# Patient Record
Sex: MALE
[De-identification: names, ages, dates, MRNs, and addresses within clinical notes are randomized per-mention and may not be internally consistent; named-entity substitution may affect disease eponyms.]

---

## 2019-08-07 ENCOUNTER — HOSPITAL ENCOUNTER (EMERGENCY)
Dept: HOSPITAL 39 - ER | Age: 49
Discharge: HOME | End: 2019-08-07
Payer: COMMERCIAL

## 2019-08-07 VITALS — DIASTOLIC BLOOD PRESSURE: 84 MMHG | SYSTOLIC BLOOD PRESSURE: 125 MMHG | OXYGEN SATURATION: 100 %

## 2019-08-07 VITALS — TEMPERATURE: 97.1 F
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
## 2019-08-07 DIAGNOSIS — S90.31XA: ICD-10-CM
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
## 2019-08-07 DIAGNOSIS — S01.01XA: Primary | ICD-10-CM

## 2019-08-07 NOTE — ED.PDOC
History of Present Illness





- General


Chief Complaint: Laceration


Stated Complaint: scalp laceration/right foot pain


Time Seen by Provider: 08/07/19 14:03


Source: patient


Exam Limitations: no limitations





- History of Present Illness


Initial Comments: 





Mark Zhao 49 y/o male came to ER stating that at work metal pipe 

accidentally fell on top of his head and right foot with bleeding on his head 

and dull ache right foot.No LOC,no blurry vision,no neck pains,N/V.


Timing/Duration: 1-3 hours


Severity: moderate


Improving Factors: nothing


Worsening Factors: nothing


Associated Symptoms: other - see hpi


Home Medications: 


Ambulatory Orders





Acetaminophen W/ Codeine [Tylenol/Codeine #4 300-60 mg] 1 ea PO ONCE PRN #10 tab

08/07/19 











Review of Systems





- Review of Systems


Musculoskeletal: States: see HPI, other - foot pain


Skin: States: see HPI, other - scalp laceration


All other Systems: Reviewed and Negative, No Change from Baseline





Past Medical History (General)





- Patient Medical History


Surgical History: no surgical history





- Vaccination History


Hx Tetanus, Diphtheria Vaccination: Yes - 2 years ago





Family Medical History





- Family History


  ** Mother


Family History: No Known





Physical Exam





- Physical Exam


General Appearance: Alert, Comfortable, No apparent distress


Eye Exam: bilateral normal


Ears, Nose, Throat: hearing grossly normal, normal ENT inspection


Neck: non-tender, full range of motion, supple, normal inspection


Respiratory: chest non-tender, lungs clear, normal breath sounds, no respiratory

distress


Cardiovascular/Chest: normal peripheral pulses, regular rate, rhythm, no murmur


Peripheral Pulses: radial,right: 2+, radial,left: 2+


Gastrointestinal/Abdominal: non tender, soft, no organomegaly


Back Exam: no CVA tenderness, no vertebral tenderness


Extremity: other - tenderness right mid foot with slight pain on plantar and 

dorsiflexion


Neurologic: alert, oriented x 3


Skin Exam: normal color, warm/dry





Progress





- Progress


Progress: 





08/07/19 14:14


                               Vital Signs - 8 hr











  08/07/19





  13:16


 


Temperature 97.1 F L


 


Pulse Rate [ 76





Left Brachial] 


 


Respiratory 20





Rate 


 


Blood Pressure 131/90





[Left Arm] 


 


O2 Sat by Pulse 97





Oximetry 











08/07/19 15:59


Discuss x-ray result with patient





- EKG/XRAY/CT


XRAY: right foot no fracture





Procedures





- Laceration/Wound Repair


  ** Head


Wound Length (cm): 1.5 - scalp


Wound's Depth, Shape: superficial


Wound Explored: no foreign body removed


Irrigated w/ Saline (cc's): 30


Volume Anesthetic (cc's): 2.5 - lidocaine gel


Wound Repaired With: staples


Number of Sutures: 2


Layer Closure?: No





Departure





- Departure


Clinical Impression: 


Contusion of scalp


Qualifiers:


 Encounter type: initial encounter Qualified Code(s): S00.03XA - Contusion of 

scalp, initial encounter





Laceration of scalp


Qualifiers:


 Encounter type: initial encounter Qualified Code(s): S01.01XA - Laceration 

without foreign body of scalp, initial encounter





Contusion of foot, right


Qualifiers:


 Encounter type: initial encounter Qualified Code(s): S90.31XA - Contusion of 

right foot, initial encounter





Time of Disposition: 15:56


Disposition: Discharge to Home or Self Care


Condition: Good


Departure Forms:  ED Discharge - Pt. Copy, Patient Portal Self Enrollment


Instructions:  DI for Laceration Repair -- Saud, Contusion (DC)


Prescriptions: 


Acetaminophen W/ Codeine [Tylenol/Codeine #4 300-60 mg] 1 ea PO ONCE PRN #10 tab


 PRN Reason: Pain


Home Medications: 


Ambulatory Orders





Acetaminophen W/ Codeine [Tylenol/Codeine #4 300-60 mg] 1 ea PO ONCE PRN #10 tab

08/07/19 








Additional Instructions: 


Removal of staples 14 August 2019 [facility];Return to Emergency room as needed

## 2019-08-07 NOTE — RAD
EXAM DESCRIPTION: 



Foot,Right 2 Views



CLINICAL HISTORY: 



pain/metal pipe fell on his foot 



COMPARISON: 



None



FINDINGS: 



Two x-ray views of the right foot were submitted. Questionable

cortical offset at the distal fourth metatarsal bone worrisome

for a nondisplaced fracture. An oblique view recommended for

further evaluation. There is no dislocation. Bone mineralization

is within normal limits. There is no radiopaque foreign body

material.



IMPRESSION: 



Questionable cortical offset at the distal fourth metatarsal bone

worrisome for a nondisplaced fracture. An oblique view

recommended for further evaluation. 



Electronically signed by:  Daniel Orta MD  8/7/2019 6:13 PM CDT

Workstation: [id number]